# Patient Record
Sex: MALE | Race: WHITE | NOT HISPANIC OR LATINO | Employment: UNEMPLOYED | URBAN - METROPOLITAN AREA
[De-identification: names, ages, dates, MRNs, and addresses within clinical notes are randomized per-mention and may not be internally consistent; named-entity substitution may affect disease eponyms.]

---
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
## 2022-03-28 ENCOUNTER — HOSPITAL ENCOUNTER (EMERGENCY)
Facility: HOSPITAL | Age: 22
Discharge: OTHER INSTITUTION | End: 2022-03-29
Attending: EMERGENCY MEDICINE | Admitting: EMERGENCY MEDICINE
Payer: COMMERCIAL

## 2022-03-28 DIAGNOSIS — F41.9 ANXIETY: ICD-10-CM

## 2022-03-28 DIAGNOSIS — F32.A DEPRESSION WITH SUICIDAL IDEATION: Primary | ICD-10-CM

## 2022-03-28 DIAGNOSIS — R45.851 DEPRESSION WITH SUICIDAL IDEATION: Primary | ICD-10-CM

## 2022-03-28 LAB
AMPHETAMINES SERPL QL SCN: NEGATIVE
BARBITURATES UR QL: NEGATIVE
BENZODIAZ UR QL: NEGATIVE
COCAINE UR QL: NEGATIVE
ETHANOL EXG-MCNC: 0 MG/DL
FLUAV RNA RESP QL NAA+PROBE: NEGATIVE
FLUBV RNA RESP QL NAA+PROBE: NEGATIVE
METHADONE UR QL: NEGATIVE
OPIATES UR QL SCN: NEGATIVE
OXYCODONE+OXYMORPHONE UR QL SCN: NEGATIVE
PCP UR QL: NEGATIVE
RSV RNA RESP QL NAA+PROBE: NEGATIVE
SARS-COV-2 RNA RESP QL NAA+PROBE: NEGATIVE
THC UR QL: NEGATIVE

## 2022-03-28 PROCEDURE — 0241U HB NFCT DS VIR RESP RNA 4 TRGT: CPT | Performed by: PHYSICIAN ASSISTANT

## 2022-03-28 PROCEDURE — 82075 ASSAY OF BREATH ETHANOL: CPT | Performed by: PHYSICIAN ASSISTANT

## 2022-03-28 PROCEDURE — 99285 EMERGENCY DEPT VISIT HI MDM: CPT

## 2022-03-28 PROCEDURE — 80307 DRUG TEST PRSMV CHEM ANLYZR: CPT | Performed by: PHYSICIAN ASSISTANT

## 2022-03-28 PROCEDURE — 99285 EMERGENCY DEPT VISIT HI MDM: CPT | Performed by: PHYSICIAN ASSISTANT

## 2022-03-28 RX ORDER — LORAZEPAM 1 MG/1
2 TABLET ORAL ONCE
Status: COMPLETED | OUTPATIENT
Start: 2022-03-28 | End: 2022-03-28

## 2022-03-28 RX ADMIN — LORAZEPAM 2 MG: 1 TABLET ORAL at 22:16

## 2022-03-28 NOTE — ED PROVIDER NOTES
History  Chief Complaint   Patient presents with    Psychiatric Evaluation     Patient reports increase in depression and anxiety and reports SI with no specific plan  Patient denies HI  Patient denies AH, but reports VH and states, "It only happens once in a while and it's nothing worht mentioning " Patient repots, "Id like to sign myself in "     Suicidal     Patient is a 63-year-old male with a past medical history of anxiety and depression who presents with worsening anxiety and depression with SI  Patient reports his symptoms have been progressively worsening over the last several weeks and every day he wakes up pain wants to die  Patient does not have any specific plan, but states he has attempted to kill himself in the past by overdosing on medications  Patient is not currently taking any daily medications and is not on any psychiatric treatment  Patient has not met with his psychiatrist or therapist in several months  Patient has never been inpatient for his symptoms in the past, but is interested in signing himself in at this time because he does not feel he can manage at home  Patient denies any AH, and contrary to triage, patient specifically denies any VH  Patient states he is otherwise in his usual state of health and denies any fevers, chills, diaphoresis, headaches, congestion, cough, shortness of breath, chest pain, abdominal pain, nausea, vomiting, diarrhea, urinary changes  Patient denies any illicit drug use  None       Past Medical History:   Diagnosis Date    Anxiety     Depression     Pectus excavatum        History reviewed  No pertinent surgical history  Family History   Problem Relation Age of Onset    Anxiety disorder Father     Bipolar disorder Sister     Autism spectrum disorder Brother      I have reviewed and agree with the history as documented      E-Cigarette/Vaping     E-Cigarette/Vaping Substances     Social History     Tobacco Use    Smoking status: Never Smoker    Smokeless tobacco: Never Used   Substance Use Topics    Alcohol use: Never    Drug use: Never       Review of Systems   Constitutional: Negative for chills, diaphoresis and fever  HENT: Negative for congestion  Respiratory: Negative for cough and shortness of breath  Cardiovascular: Negative for chest pain and palpitations  Gastrointestinal: Negative for abdominal pain, diarrhea, nausea and vomiting  Genitourinary: Negative for difficulty urinating  Musculoskeletal: Negative for myalgias  Skin: Negative for color change, pallor and rash  Neurological: Negative for dizziness, light-headedness and headaches  Psychiatric/Behavioral: Positive for suicidal ideas  Negative for self-injury  The patient is nervous/anxious  All other systems reviewed and are negative  Physical Exam  Physical Exam  Vitals and nursing note reviewed  Constitutional:       General: He is awake  He is not in acute distress  Appearance: He is well-developed  He is not ill-appearing, toxic-appearing or diaphoretic  HENT:      Head: Normocephalic and atraumatic  Right Ear: External ear normal       Left Ear: External ear normal       Nose: Nose normal    Eyes:      General: No scleral icterus  Conjunctiva/sclera: Conjunctivae normal       Pupils: Pupils are equal, round, and reactive to light  Neck:      Vascular: No JVD  Trachea: No tracheal deviation  Cardiovascular:      Rate and Rhythm: Normal rate and regular rhythm  Heart sounds: Normal heart sounds  Pulmonary:      Effort: Pulmonary effort is normal       Breath sounds: Normal breath sounds  No decreased breath sounds or wheezing  Abdominal:      General: There is no distension  Musculoskeletal:         General: No deformity  Cervical back: Normal range of motion  Comments: Moving all extremities freely, ambulating without issue   Skin:     General: Skin is dry  Findings: No rash  Neurological:      Mental Status: He is alert and oriented to person, place, and time  GCS: GCS eye subscore is 4  GCS verbal subscore is 5  GCS motor subscore is 6  Psychiatric:         Attention and Perception: Attention normal  He does not perceive auditory or visual hallucinations  Mood and Affect: Mood is anxious and depressed  Affect is flat  Speech: Speech normal          Behavior: Behavior normal  Behavior is cooperative  Thought Content: Thought content includes suicidal ideation  Thought content does not include homicidal ideation  Thought content does not include homicidal or suicidal plan  Vital Signs  ED Triage Vitals [03/28/22 1916]   Temperature Pulse Respirations Blood Pressure SpO2   98 °F (36 7 °C) 85 18 128/70 98 %      Temp src Heart Rate Source Patient Position - Orthostatic VS BP Location FiO2 (%)   -- Monitor Sitting Right arm --      Pain Score       No Pain           Vitals:    03/28/22 1916   BP: 128/70   Pulse: 85   Patient Position - Orthostatic VS: Sitting         Visual Acuity      ED Medications  Medications - No data to display    Diagnostic Studies  Results Reviewed     Procedure Component Value Units Date/Time    COVID/FLU/RSV - 2 hour TAT [[id number]]  (Normal) Collected: 03/28/22 1943    Lab Status: Final result Specimen: Nares from Nose Updated: 03/28/22 2036     SARS-CoV-2 Negative     INFLUENZA A PCR Negative     INFLUENZA B PCR Negative     RSV PCR Negative    Narrative:      FOR PEDIATRIC PATIENTS - copy/paste COVID Guidelines URL to browser: [email or web address]    SARS-CoV-2 assay is a Nucleic Acid Amplification assay intended for the  qualitative detection of nucleic acid from SARS-CoV-2 in nasopharyngeal  swabs  Results are for the presumptive identification of SARS-CoV-2 RNA      Positive results are indicative of infection with SARS-CoV-2, the virus  causing COVID-19, but do not rule out bacterial infection or co-infection  with other viruses  Laboratories within the [facility] and its  territories are required to report all positive results to the appropriate  public health authorities  Negative results do not preclude SARS-CoV-2  infection and should not be used as the sole basis for treatment or other  patient management decisions  Negative results must be combined with  clinical observations, patient history, and epidemiological information  This test has not been FDA cleared or approved  This test has been authorized by FDA under an Emergency Use Authorization  (EUA)  This test is only authorized for the duration of time the  declaration that circumstances exist justifying the authorization of the  emergency use of an in vitro diagnostic tests for detection of SARS-CoV-2  virus and/or diagnosis of COVID-19 infection under section 564(b)(1) of  the Act, 21 U  S C  973AUI-2(V)(9), unless the authorization is terminated  or revoked sooner  The test has been validated but independent review by FDA  and CLIA is pending  Test performed using [employer] Gene[employer]pert: This RT-PCR assay targets N2,  a region unique to SARS-CoV-2  A conserved region in the E-gene was chosen  for pan-Sarbecovirus detection which includes SARS-CoV-2  Rapid drug screen, urine [[id number]]  (Normal) Collected: 03/28/22 1943    Lab Status: Final result Specimen: Urine, Catheter Updated: 03/28/22 2013     Amph/Meth UR Negative     Barbiturate Ur Negative     Benzodiazepine Urine Negative     Cocaine Urine Negative     Methadone Urine Negative     Opiate Urine Negative     PCP Ur Negative     THC Urine Negative     Oxycodone Urine Negative    Narrative:      FOR MEDICAL PURPOSES ONLY  IF CONFIRMATION NEEDED PLEASE CONTACT THE LAB WITHIN 5 DAYS      Drug Screen Cutoff Levels:  AMPHETAMINE/METHAMPHETAMINES  1000 ng/mL  BARBITURATES     200 ng/mL  BENZODIAZEPINES     200 ng/mL  COCAINE      300 ng/mL  METHADONE      300 ng/mL  OPIATES      300 ng/mL  PHENCYCLIDINE     25 ng/mL  THC       50 ng/mL  OXYCODONE      100 ng/mL    POCT alcohol breath test [[id number]]  (Normal) Resulted: 03/28/22 1936    Lab Status: Final result Updated: 03/28/22 1936     EXTBreath Alcohol 0 000                 No orders to display              Procedures  Procedures         ED Course  ED Course as of 03/28/22 2039   Mon Mar 28, 2022   2008 Patient signed 201 2038 Patient signed out to [facility] pending placement  MDM    Disposition  Final diagnoses:   Depression with suicidal ideation   Anxiety     Time reflects when diagnosis was documented in both MDM as applicable and the Disposition within this note     Time User Action Codes Description Comment    3/28/2022  7:47 PM Mitcehll Jaquez Add [P91 368] Suicidal ideation     3/28/2022  7:47 PM Gem Daley [Z04 009] Suicidal ideation     3/28/2022  7:47 PM Mat Gama Add [F32  Charmian Mower Depression with suicidal ideation     3/28/2022  7:47 PM Mitchell Jaquez Add [F41 9] Anxiety       ED Disposition     ED Disposition Condition Date/Time Comment    Transfer to [facility] Mar 28, 2022  8:09 PM Paty Woodward should be transferred out to inpatient psych and has been medically cleared  Follow-up Information    None         Patient's Medications    No medications on file       No discharge procedures on file      PDMP Review     None          ED Provider  Electronically Signed by           Compa Alvarez PA-C  03/28/22 2039

## 2022-03-28 NOTE — ED NOTES
Patient presents to the Emergency Department with increasing and worsening depression, anxiety and suicidal ideation  Patient reports a history of significant anxiety and depression, and states these have been increasing in magnitude over the past month or so  Patient reports increasing suicidal ideation, has no specific plan, but fears impulsive behaviors that would lead to him ending his own life  Patient reports his anxiety has held him back from enjoying life, as well as being able to hold a job or complete college  Patient reports he is not currently in treatment, but had seen a psychiatrist in the past  Patient reports he stopped seeing that psychiatrist because they were not helpful, and reports that he had requested a new psychiatrist but was turned down  Patient had been taking Zoloft but has not been taking medications for a while  Patient denies homicidal ideation, auditory/visual/tactile hallucinations at this time  Patient reports poor sleep patterns and states his appetite is fair  Patient would like to sign himself in for inpatient mental health treatment to ensure that he stays safe and receives the help he needs      Abrahan Can  Crisis Worker, [place]  03/28/22

## 2022-03-29 VITALS
WEIGHT: 174.16 LBS | BODY MASS INDEX: 23.59 KG/M2 | OXYGEN SATURATION: 98 % | DIASTOLIC BLOOD PRESSURE: 72 MMHG | SYSTOLIC BLOOD PRESSURE: 125 MMHG | HEIGHT: 72 IN | HEART RATE: 82 BPM | RESPIRATION RATE: 16 BRPM | TEMPERATURE: 98 F

## 2022-03-29 NOTE — ED NOTES
Explained to patient how bed search works  Patient open to bed search within two hours of [place] if the facility arranges for transportation home      Janki Gonzales  Crisis Worker, [place]  03/28/22

## 2022-03-29 NOTE — EMTALA/ACUTE CARE TRANSFER
[facility] [address] [facility] [address]  Dept: [phone]      EMTALA TRANSFER CONSENT    NAME Yumi De Leon                                         2000                              MRN [id number]    I have been informed of my rights regarding examination, treatment, and transfer   by Dr Mitch Lennon DO    Benefits: Other benefits (Include comment)_______________________ (Psych)    Risks: Increased discomfort during transfer,Possible worsening of condition or death during transfer      Consent for Transfer:  I acknowledge that my medical condition has been evaluated and explained to me by the emergency department physician or other qualified medical person and/or my attending physician, who has recommended that I be transferred to the service of  Accepting Physician: Dr Richard Huntley at [address] [place] [address] Rd Name, [address] :  Tobias Barbosa  The above potential benefits of such transfer, the potential risks associated with such transfer, and the probable risks of not being transferred have been explained to me, and I fully understand them  The doctor has explained that, in my case, the benefits of transfer outweigh the risks  I agree to be transferred  I authorize the performance of emergency medical procedures and treatments upon me in both transit and upon arrival at the receiving facility  Additionally, I authorize the release of any and all medical records to the receiving facility and request they be transported with me, if possible  I understand that the safest mode of transportation during a medical emergency is an ambulance and that the Hospital advocates the use of this mode of transport  Risks of traveling to the receiving facility by car, including absence of medical control, life sustaining equipment, such as oxygen, and medical personnel has been explained to me and I fully understand them      ([address] [facility]  [  ] I consent to the stated transfer and to be transported by ambulance/helicopter  [  ]  I consent to the stated transfer, but refuse transportation by ambulance and accept full responsibility for my transportation by car  I understand the risks of non-ambulance transfers and I exonerate the Hospital and its staff from any deterioration in my condition that results from this refusal     X___________________________________________    DATE  22  TIME________  Signature of patient or legally responsible individual signing on patient behalf           RELATIONSHIP TO PATIENT_________________________          Provider Certification    NAME Yumi De Leon                                         2000                              MRN [id number]    A medical screening exam was performed on the above named patient  Based on the examination:    Condition Necessitating Transfer The primary encounter diagnosis was Depression with suicidal ideation  A diagnosis of Anxiety was also pertinent to this visit  Patient Condition: Other (Include comment)_________________________________________ (Psych)    Reason for Transfer: Other (Include comment)____________________ (Psych)    Transfer Requirements: [address]   · Space available and qualified personnel available for treatment as acknowledged by [facility]  [phone]  · Agreed to accept transfer and to provide appropriate medical treatment as acknowledged by       Dr Richard Huntley  · Appropriate medical records of the examination and treatment of the patient are provided at the time of transfer   [address] _______  · Transfer will be performed by qualified personnel from [address] [place] [address]  and appropriate transfer equipment as required, including the use of necessary and appropriate life support measures      Provider Certification: I have examined the patient and explained the following risks and benefits of being transferred/refusing transfer to the patient/family:  The patient is stable for psychiatric transfer because they are medically stable, and is protected from harming him/herself or others during transport      Based on these reasonable risks and benefits to the patient and/or the unborn child(percy), and based upon the information available at the time of the patients examination, I certify that the medical benefits reasonably to be expected from the provision of appropriate medical treatments at another medical facility outweigh the increasing risks, if any, to the individuals medical condition, and in the case of labor to the unborn child, from effecting the transfer      X____________________________________________ DATE 03/29/22        TIME_______      ORIGINAL - SEND TO MEDICAL RECORDS   COPY - SEND WITH PATIENT DURING TRANSFER

## 2022-03-29 NOTE — ED NOTES
Patient accepted to [facility] by Dr Uma Galvez  UR Contact: Mela Radhakristal  [phone]  Fax [phone]  He can arrive anytime, earlier is better  Call [facility] with ETA once known

## 2022-03-29 NOTE — ED NOTES
Spoke with Radha Schmidt in admissions at [facility]  They have 1 available bed, and are willing to review a referral for this patient  Faxed referral as directed

## 2022-03-29 NOTE — ED NOTES
Insurance Authorization for admission:   Phone call placed to [email or web address] number: [phone]  Spoke to Alicia Farias  4 days approved  Level of care: acute inpatient mental health  Review on 4/1/22  Authorization # [id number]        EVS (Eligibility Verification System) called - [phone]  Automated system indicates: MA FFS    Insurance Authorization for Transportation:  KUBOO authorization information provided to Amimon in admissions at [facility]  Spoke to Wellman at [facility] and provided information  Requested transport  They will call back when pickup time is known

## 2022-03-29 NOTE — ED NOTES
201 Signed- Rights Explained- Original on chart- Faxed to [facility]      Johanne Gregg  Crisis Worker, [place]  03/28/22

## 2024-09-20 ENCOUNTER — APPOINTMENT (OUTPATIENT)
Dept: URGENT CARE | Facility: MEDICAL CENTER | Age: 24
End: 2024-09-20